# Patient Record
Sex: FEMALE | Race: WHITE
[De-identification: names, ages, dates, MRNs, and addresses within clinical notes are randomized per-mention and may not be internally consistent; named-entity substitution may affect disease eponyms.]

---

## 2018-12-03 ENCOUNTER — TRANSCRIPTION ENCOUNTER (OUTPATIENT)
Age: 23
End: 2018-12-03

## 2019-05-07 ENCOUNTER — TRANSCRIPTION ENCOUNTER (OUTPATIENT)
Age: 24
End: 2019-05-07

## 2022-03-17 ENCOUNTER — TRANSCRIPTION ENCOUNTER (OUTPATIENT)
Age: 27
End: 2022-03-17

## 2022-04-28 ENCOUNTER — APPOINTMENT (OUTPATIENT)
Dept: INTERNAL MEDICINE | Facility: CLINIC | Age: 27
End: 2022-04-28
Payer: COMMERCIAL

## 2022-04-28 ENCOUNTER — NON-APPOINTMENT (OUTPATIENT)
Age: 27
End: 2022-04-28

## 2022-04-28 ENCOUNTER — TRANSCRIPTION ENCOUNTER (OUTPATIENT)
Age: 27
End: 2022-04-28

## 2022-04-28 VITALS
BODY MASS INDEX: 27.99 KG/M2 | OXYGEN SATURATION: 96 % | WEIGHT: 168 LBS | SYSTOLIC BLOOD PRESSURE: 120 MMHG | DIASTOLIC BLOOD PRESSURE: 70 MMHG | HEIGHT: 65 IN | HEART RATE: 86 BPM

## 2022-04-28 DIAGNOSIS — M21.541 ACQUIRED CLUBFOOT, RIGHT FOOT: ICD-10-CM

## 2022-04-28 DIAGNOSIS — Z80.8 FAMILY HISTORY OF MALIGNANT NEOPLASM OF OTHER ORGANS OR SYSTEMS: ICD-10-CM

## 2022-04-28 DIAGNOSIS — M21.542 ACQUIRED CLUBFOOT, RIGHT FOOT: ICD-10-CM

## 2022-04-28 DIAGNOSIS — Z80.3 FAMILY HISTORY OF MALIGNANT NEOPLASM OF BREAST: ICD-10-CM

## 2022-04-28 DIAGNOSIS — Z82.49 FAMILY HISTORY OF ISCHEMIC HEART DISEASE AND OTHER DISEASES OF THE CIRCULATORY SYSTEM: ICD-10-CM

## 2022-04-28 DIAGNOSIS — R53.81 OTHER MALAISE: ICD-10-CM

## 2022-04-28 DIAGNOSIS — Z86.69 PERSONAL HISTORY OF OTHER DISEASES OF THE NERVOUS SYSTEM AND SENSE ORGANS: ICD-10-CM

## 2022-04-28 DIAGNOSIS — R53.83 OTHER MALAISE: ICD-10-CM

## 2022-04-28 DIAGNOSIS — Z01.419 ENCOUNTER FOR GYNECOLOGICAL EXAMINATION (GENERAL) (ROUTINE) W/OUT ABNORMAL FINDINGS: ICD-10-CM

## 2022-04-28 PROCEDURE — 99385 PREV VISIT NEW AGE 18-39: CPT | Mod: 25

## 2022-04-28 NOTE — HEALTH RISK ASSESSMENT
[Excellent] : ~his/her~  mood as  excellent [Never] : Never [No] : In the past 12 months have you used drugs other than those required for medical reasons? No [0] : 2) Feeling down, depressed, or hopeless: Not at all (0) [PHQ-2 Negative - No further assessment needed] : PHQ-2 Negative - No further assessment needed [Sexually Active] : sexually active [Smoke Detector] : smoke detector [Carbon Monoxide Detector] : carbon monoxide detector [Seat Belt] :  uses seat belt [Sunscreen] : uses sunscreen [Patient reported PAP Smear was normal] : Patient reported PAP Smear was normal [None] : None [With Significant Other] : lives with significant other [Employed] : employed [Significant Other] : lives with significant other [College] : College [# Of Children ___] : has [unfilled] children [de-identified] : walking [[id number]] : 0 [High Risk Behavior] : no high risk behavior [Reports changes in hearing] : Reports no changes in hearing [Reports changes in vision] : Reports no changes in vision [Reports changes in dental health] : Reports no changes in dental health [PapSmearDate] : 08/19

## 2022-04-28 NOTE — HISTORY OF PRESENT ILLNESS
[FreeTextEntry1] : New patient [de-identified] : 26 year old F comes to establish medical care and for an annual exam. Last PCP was Dr. Shashi Hinkle in [place], last seen in 2019. \par \par Hx of chronic migraines, had seen multiple neurologists in past. Tried numerous medications but did not agree with her. \par She has been gaining weight and is unsure why. She tries to follow healthy diet. Currently weighs 168 lb which is the highest she has ever weighed. Often feels tired during day. Sleeps 9 hours during work week. No snoring.

## 2022-04-28 NOTE — REVIEW OF SYSTEMS
[Negative] : Heme/Lymph [Fatigue] : fatigue [Recent Change In Weight] : ~T recent weight change [Headache] : headache [FreeTextEntry2] : weight gain

## 2022-04-28 NOTE — PHYSICAL EXAM
[No Acute Distress] : no acute distress [Well Nourished] : well nourished [Well Developed] : well developed [Well-Appearing] : well-appearing [Normal Sclera/Conjunctiva] : normal sclera/conjunctiva [PERRL] : pupils equal round and reactive to light [EOMI] : extraocular movements intact [Normal Outer Ear/Nose] : the outer ears and nose were normal in appearance [Normal Oropharynx] : the oropharynx was normal [Normal TMs] : both tympanic membranes were normal [No Lymphadenopathy] : no lymphadenopathy [Supple] : supple [Thyroid Normal, No Nodules] : the thyroid was normal and there were no nodules present [No Respiratory Distress] : no respiratory distress  [No Accessory Muscle Use] : no accessory muscle use [Clear to Auscultation] : lungs were clear to auscultation bilaterally [Normal Rate] : normal rate  [Regular Rhythm] : with a regular rhythm [Normal S1, S2] : normal S1 and S2 [No Murmur] : no murmur heard [No Varicosities] : no varicosities [Pedal Pulses Present] : the pedal pulses are present [No Edema] : there was no peripheral edema [Soft] : abdomen soft [Non Tender] : non-tender [Non-distended] : non-distended [No Masses] : no abdominal mass palpated [No HSM] : no HSM [Normal Bowel Sounds] : normal bowel sounds [Normal Posterior Cervical Nodes] : no posterior cervical lymphadenopathy [Normal Anterior Cervical Nodes] : no anterior cervical lymphadenopathy [No CVA Tenderness] : no CVA  tenderness [No Spinal Tenderness] : no spinal tenderness [No Joint Swelling] : no joint swelling [Grossly Normal Strength/Tone] : grossly normal strength/tone [No Rash] : no rash [Coordination Grossly Intact] : coordination grossly intact [No Focal Deficits] : no focal deficits [Normal Gait] : normal gait [Deep Tendon Reflexes (DTR)] : deep tendon reflexes were 2+ and symmetric [Normal Affect] : the affect was normal [Normal Mood] : the mood was normal [Normal Insight/Judgement] : insight and judgment were intact [de-identified] : friendly [de-identified] : defer to GYN

## 2022-04-28 NOTE — PLAN
[FreeTextEntry1] : Check routine fasting labs.\par Discussed diet, exercise, and weight maintenance. She will work on losing 10-15 lb.\par Referred to GYN Dr. Martinez for annual and PAP.\par Vaccines UTD. \par Start Fioricet PRN for migraines.  website consulted. Neurology referral.\par Generalized fatigue - check full lab panel. \par \par RTO in 1 yr for annual exam or earlier PRN for acute care.\par

## 2022-04-28 NOTE — PAST MEDICAL HISTORY
[Menstruating] : menstruating [Normal Amount/Duration] : it was of a normal amount and duration [Normal Duration] : the duration was normal [Regular Cycle Intervals] : have been regular [Total Preg ___] : G[unfilled]

## 2022-04-29 ENCOUNTER — TRANSCRIPTION ENCOUNTER (OUTPATIENT)
Age: 27
End: 2022-04-29

## 2022-04-29 LAB
ALBUMIN SERPL ELPH-MCNC: 4.7 G/DL
ALP BLD-CCNC: 112 U/L
ALT SERPL-CCNC: 14 U/L
ANION GAP SERPL CALC-SCNC: 13 MMOL/L
AST SERPL-CCNC: 20 U/L
BASOPHILS # BLD AUTO: 0.05 K/UL
BASOPHILS NFR BLD AUTO: 0.4 %
BILIRUB SERPL-MCNC: 0.2 MG/DL
BUN SERPL-MCNC: 18 MG/DL
CALCIUM SERPL-MCNC: 9.7 MG/DL
CHLORIDE SERPL-SCNC: 103 MMOL/L
CHOLEST SERPL-MCNC: 165 MG/DL
CO2 SERPL-SCNC: 24 MMOL/L
CREAT SERPL-MCNC: 0.78 MG/DL
EGFR: 106 ML/MIN/1.73M2
EOSINOPHIL # BLD AUTO: 0.04 K/UL
EOSINOPHIL NFR BLD AUTO: 0.3 %
ESTIMATED AVERAGE GLUCOSE: 100 MG/DL
GLUCOSE SERPL-MCNC: 63 MG/DL
HBA1C MFR BLD HPLC: 5.1 %
HCT VFR BLD CALC: 39.7 %
HDLC SERPL-MCNC: 65 MG/DL
HGB BLD-MCNC: 12.6 G/DL
IMM GRANULOCYTES NFR BLD AUTO: 0.2 %
LDLC SERPL CALC-MCNC: 82 MG/DL
LYMPHOCYTES # BLD AUTO: 3.21 K/UL
LYMPHOCYTES NFR BLD AUTO: 26.6 %
MAN DIFF?: NORMAL
MCHC RBC-ENTMCNC: 27 PG
MCHC RBC-ENTMCNC: 31.7 GM/DL
MCV RBC AUTO: 85 FL
MONOCYTES # BLD AUTO: 0.57 K/UL
MONOCYTES NFR BLD AUTO: 4.7 %
NEUTROPHILS # BLD AUTO: 8.16 K/UL
NEUTROPHILS NFR BLD AUTO: 67.8 %
NONHDLC SERPL-MCNC: 99 MG/DL
PLATELET # BLD AUTO: 287 K/UL
POTASSIUM SERPL-SCNC: 5.4 MMOL/L
PROT SERPL-MCNC: 7.2 G/DL
RBC # BLD: 4.67 M/UL
RBC # FLD: 13.2 %
SODIUM SERPL-SCNC: 139 MMOL/L
TRIGL SERPL-MCNC: 85 MG/DL
TSH SERPL-ACNC: 2.78 UIU/ML
VIT B12 SERPL-MCNC: 427 PG/ML
WBC # FLD AUTO: 12.06 K/UL

## 2022-05-31 ENCOUNTER — TRANSCRIPTION ENCOUNTER (OUTPATIENT)
Age: 27
End: 2022-05-31

## 2022-05-31 DIAGNOSIS — F41.9 ANXIETY DISORDER, UNSPECIFIED: ICD-10-CM

## 2022-07-07 ENCOUNTER — TRANSCRIPTION ENCOUNTER (OUTPATIENT)
Age: 27
End: 2022-07-07

## 2022-07-12 ENCOUNTER — APPOINTMENT (OUTPATIENT)
Dept: INTERNAL MEDICINE | Facility: CLINIC | Age: 27
End: 2022-07-12
Payer: COMMERCIAL

## 2022-07-12 VITALS
HEART RATE: 80 BPM | RESPIRATION RATE: 18 BRPM | BODY MASS INDEX: 28.16 KG/M2 | TEMPERATURE: 97.3 F | DIASTOLIC BLOOD PRESSURE: 70 MMHG | OXYGEN SATURATION: 98 % | SYSTOLIC BLOOD PRESSURE: 120 MMHG | WEIGHT: 169 LBS | HEIGHT: 65 IN

## 2022-07-12 PROCEDURE — 99213 OFFICE O/P EST LOW 20 MIN: CPT

## 2022-07-12 NOTE — REVIEW OF SYSTEMS
[Suicidal] : not suicidal [Insomnia] : no insomnia [Anxiety] : anxiety [Depression] : depression [Negative] : Constitutional

## 2022-07-12 NOTE — PHYSICAL EXAM
[Normal] : normal rate, regular rhythm, normal S1 and S2 and no murmur heard [No Edema] : there was no peripheral edema [Soft] : abdomen soft [Non Tender] : non-tender [Normal Affect] : the affect was normal [de-identified] : somber mood

## 2022-07-12 NOTE — PLAN
[FreeTextEntry1] : Start Lexapro 10 mg daily. Advised can take few weeks to give positive effect.\par Followed weekly with therapist.\par Provided contact info for [facility] but unsure if they take her insurance.\par Pt to message me in a few weeks on patient portal with clinical update.

## 2022-07-12 NOTE — HISTORY OF PRESENT ILLNESS
[FreeTextEntry1] : depression/anxiety [de-identified] : Pt comes for follow-up.\par \par She recently starting seeing a therapist who told her she has depression w/ anxiety. She is curious of starting medication. \par She often does not feel happy despite good things happening to her; recently bought house in [place] and is engaged to get  in 6/23. She is anhedonic and does not like leaving her home, prefers to watch TV often. She does not like to see her friends. She is afraid of going to public places. Has had anxiety for at least 1 yr. Has unexplained crying fits. Mood negatively affected since onset of pandemic. No insomnia. No SI/HI.\par Her father has depression and takes medications. She is concerned of hereditary component. \par She has been unable to find a psychiatrist through her insurance plan.

## 2022-07-18 ENCOUNTER — TRANSCRIPTION ENCOUNTER (OUTPATIENT)
Age: 27
End: 2022-07-18

## 2022-07-26 ENCOUNTER — TRANSCRIPTION ENCOUNTER (OUTPATIENT)
Age: 27
End: 2022-07-26

## 2022-07-26 RX ORDER — ALPRAZOLAM 0.25 MG/1
0.25 TABLET ORAL
Qty: 15 | Refills: 0 | Status: ACTIVE | COMMUNITY
Start: 2022-07-26 | End: 1900-01-01

## 2023-01-04 ENCOUNTER — TRANSCRIPTION ENCOUNTER (OUTPATIENT)
Age: 28
End: 2023-01-04

## 2023-05-01 ENCOUNTER — APPOINTMENT (OUTPATIENT)
Dept: INTERNAL MEDICINE | Facility: CLINIC | Age: 28
End: 2023-05-01
Payer: COMMERCIAL

## 2023-05-01 VITALS
BODY MASS INDEX: 31.16 KG/M2 | SYSTOLIC BLOOD PRESSURE: 110 MMHG | DIASTOLIC BLOOD PRESSURE: 68 MMHG | OXYGEN SATURATION: 97 % | TEMPERATURE: 98.2 F | HEART RATE: 81 BPM | HEIGHT: 65 IN | WEIGHT: 187 LBS

## 2023-05-01 DIAGNOSIS — D72.829 ELEVATED WHITE BLOOD CELL COUNT, UNSPECIFIED: ICD-10-CM

## 2023-05-01 PROCEDURE — 99395 PREV VISIT EST AGE 18-39: CPT | Mod: 25

## 2023-05-01 RX ORDER — BUTALBITAL, ACETAMINOPHEN AND CAFFEINE 325; 50; 40 MG/1; MG/1; MG/1
50-325-40 TABLET ORAL
Qty: 100 | Refills: 3 | Status: DISCONTINUED | COMMUNITY
Start: 2022-04-28 | End: 2023-05-01

## 2023-05-01 NOTE — REVIEW OF SYSTEMS
[Recent Change In Weight] : ~T recent weight change [Headache] : headache [Negative] : Heme/Lymph [Fatigue] : no fatigue [FreeTextEntry2] : 20 lb gain

## 2023-05-01 NOTE — PLAN
[FreeTextEntry1] : Check routine fasting labs.\par Discussed diet, exercise, and weight maintenance. Give trial of Phentermine.  website consulted.\par GYN exam and PAP UTD.\par Vaccines UTD. Flu shot in Fall.\par Mood is well-controlled on Lexapro 10 mg daily. She may eventually want to taper off the Lexapro. She stopped seeing therapist.\par She will see headache specialist in [place] for chronic migraines. Failed multiple medications.\par Recheck CBC - previous elevated WBC.\par \par RTO 3 months.\par

## 2023-05-01 NOTE — PHYSICAL EXAM
[No Acute Distress] : no acute distress [Well Nourished] : well nourished [Well Developed] : well developed [Well-Appearing] : well-appearing [Normal Sclera/Conjunctiva] : normal sclera/conjunctiva [PERRL] : pupils equal round and reactive to light [EOMI] : extraocular movements intact [Normal Outer Ear/Nose] : the outer ears and nose were normal in appearance [Normal Oropharynx] : the oropharynx was normal [Normal TMs] : both tympanic membranes were normal [No Lymphadenopathy] : no lymphadenopathy [Supple] : supple [Thyroid Normal, No Nodules] : the thyroid was normal and there were no nodules present [No Respiratory Distress] : no respiratory distress  [No Accessory Muscle Use] : no accessory muscle use [Clear to Auscultation] : lungs were clear to auscultation bilaterally [Normal Rate] : normal rate  [Regular Rhythm] : with a regular rhythm [Normal S1, S2] : normal S1 and S2 [No Murmur] : no murmur heard [No Varicosities] : no varicosities [Pedal Pulses Present] : the pedal pulses are present [No Edema] : there was no peripheral edema [Soft] : abdomen soft [Non Tender] : non-tender [Non-distended] : non-distended [No Masses] : no abdominal mass palpated [No HSM] : no HSM [Normal Bowel Sounds] : normal bowel sounds [No CVA Tenderness] : no CVA  tenderness [No Spinal Tenderness] : no spinal tenderness [No Joint Swelling] : no joint swelling [Grossly Normal Strength/Tone] : grossly normal strength/tone [No Rash] : no rash [Coordination Grossly Intact] : coordination grossly intact [No Focal Deficits] : no focal deficits [Normal Gait] : normal gait [Deep Tendon Reflexes (DTR)] : deep tendon reflexes were 2+ and symmetric [Normal Affect] : the affect was normal [Normal Mood] : the mood was normal [Normal Insight/Judgement] : insight and judgment were intact [de-identified] : friendly [de-identified] : defer to GYN

## 2023-05-01 NOTE — HEALTH RISK ASSESSMENT
[Excellent] : ~his/her~  mood as  excellent [No] : In the past 12 months have you used drugs other than those required for medical reasons? No [0] : 2) Feeling down, depressed, or hopeless: Not at all (0) [PHQ-2 Negative - No further assessment needed] : PHQ-2 Negative - No further assessment needed [Patient reported PAP Smear was normal] : Patient reported PAP Smear was normal [None] : None [With Significant Other] : lives with significant other [Employed] : employed [College] : College [Significant Other] : lives with significant other [# Of Children ___] : has [unfilled] children [Sexually Active] : sexually active [Smoke Detector] : smoke detector [Carbon Monoxide Detector] : carbon monoxide detector [Seat Belt] :  uses seat belt [Sunscreen] : uses sunscreen [Never] : Never [de-identified] : walking [AAT2Ggexl] : 0 [High Risk Behavior] : no high risk behavior [Reports changes in hearing] : Reports no changes in hearing [Reports changes in vision] : Reports no changes in vision [Reports changes in dental health] : Reports no changes in dental health [PapSmearDate] : 01/23

## 2023-05-01 NOTE — HISTORY OF PRESENT ILLNESS
[FreeTextEntry1] : Physical [de-identified] : Patient comes for an annual exam.\par \par She reports feeling well.\par Her mood has vastly improved on Lexapro 10 mg daily. She stopped seeing the therapist. \par She gained about 20 lb x 1 yr. She does admit to eating healthy and has been exercising. She is not sure why she is gaining weight but thinks it may be from the Lexapro. She may want to eventually taper off the Lexapro.\par Had recent migraine, had not had one in 4-5 months. Tried Fioricet but did not help.\par She is getting  next month. Bachelorette party is this weekend.

## 2023-05-02 ENCOUNTER — TRANSCRIPTION ENCOUNTER (OUTPATIENT)
Age: 28
End: 2023-05-02

## 2023-05-02 LAB
ALBUMIN SERPL ELPH-MCNC: 4.7 G/DL
ALP BLD-CCNC: 113 U/L
ALT SERPL-CCNC: 14 U/L
ANION GAP SERPL CALC-SCNC: 13 MMOL/L
APPEARANCE: CLEAR
AST SERPL-CCNC: 16 U/L
BACTERIA: ABNORMAL /HPF
BASOPHILS # BLD AUTO: 0.04 K/UL
BASOPHILS NFR BLD AUTO: 0.4 %
BILIRUB SERPL-MCNC: 0.2 MG/DL
BILIRUBIN URINE: NEGATIVE
BLOOD URINE: NEGATIVE
BUN SERPL-MCNC: 16 MG/DL
CALCIUM SERPL-MCNC: 9.8 MG/DL
CAST: 0 /LPF
CHLORIDE SERPL-SCNC: 105 MMOL/L
CHOLEST SERPL-MCNC: 164 MG/DL
CO2 SERPL-SCNC: 22 MMOL/L
COLOR: YELLOW
CREAT SERPL-MCNC: 0.7 MG/DL
EGFR: 120 ML/MIN/1.73M2
EOSINOPHIL # BLD AUTO: 0.02 K/UL
EOSINOPHIL NFR BLD AUTO: 0.2 %
EPITHELIAL CELLS: 10 /HPF
ESTIMATED AVERAGE GLUCOSE: 103 MG/DL
GLUCOSE QUALITATIVE U: NEGATIVE MG/DL
GLUCOSE SERPL-MCNC: 91 MG/DL
HBA1C MFR BLD HPLC: 5.2 %
HCT VFR BLD CALC: 39 %
HDLC SERPL-MCNC: 52 MG/DL
HGB BLD-MCNC: 12.7 G/DL
IMM GRANULOCYTES NFR BLD AUTO: 0.3 %
KETONES URINE: NEGATIVE MG/DL
LDLC SERPL CALC-MCNC: 90 MG/DL
LEUKOCYTE ESTERASE URINE: NEGATIVE
LYMPHOCYTES # BLD AUTO: 2.76 K/UL
LYMPHOCYTES NFR BLD AUTO: 24.8 %
MAN DIFF?: NORMAL
MCHC RBC-ENTMCNC: 27.2 PG
MCHC RBC-ENTMCNC: 32.6 GM/DL
MCV RBC AUTO: 83.5 FL
MICROSCOPIC-UA: NORMAL
MONOCYTES # BLD AUTO: 0.49 K/UL
MONOCYTES NFR BLD AUTO: 4.4 %
NEUTROPHILS # BLD AUTO: 7.78 K/UL
NEUTROPHILS NFR BLD AUTO: 69.9 %
NITRITE URINE: NEGATIVE
NONHDLC SERPL-MCNC: 112 MG/DL
PH URINE: 6
PLATELET # BLD AUTO: 324 K/UL
POTASSIUM SERPL-SCNC: 4.4 MMOL/L
PROT SERPL-MCNC: 7 G/DL
PROTEIN URINE: NEGATIVE MG/DL
RBC # BLD: 4.67 M/UL
RBC # FLD: 13.7 %
RED BLOOD CELLS URINE: 1 /HPF
SODIUM SERPL-SCNC: 139 MMOL/L
SPECIFIC GRAVITY URINE: 1.02
TRIGL SERPL-MCNC: 108 MG/DL
TSH SERPL-ACNC: 1.11 UIU/ML
UROBILINOGEN URINE: 0.2 MG/DL
VIT B12 SERPL-MCNC: 416 PG/ML
WBC # FLD AUTO: 11.12 K/UL
WHITE BLOOD CELLS URINE: 7 /HPF

## 2023-05-10 ENCOUNTER — TRANSCRIPTION ENCOUNTER (OUTPATIENT)
Age: 28
End: 2023-05-10

## 2023-05-13 ENCOUNTER — TRANSCRIPTION ENCOUNTER (OUTPATIENT)
Age: 28
End: 2023-05-13

## 2023-06-08 ENCOUNTER — TRANSCRIPTION ENCOUNTER (OUTPATIENT)
Age: 28
End: 2023-06-08

## 2023-07-12 ENCOUNTER — TRANSCRIPTION ENCOUNTER (OUTPATIENT)
Age: 28
End: 2023-07-12

## 2023-08-23 ENCOUNTER — TRANSCRIPTION ENCOUNTER (OUTPATIENT)
Age: 28
End: 2023-08-23

## 2023-08-28 ENCOUNTER — NON-APPOINTMENT (OUTPATIENT)
Age: 28
End: 2023-08-28

## 2023-09-07 ENCOUNTER — APPOINTMENT (OUTPATIENT)
Dept: INTERNAL MEDICINE | Facility: CLINIC | Age: 28
End: 2023-09-07
Payer: COMMERCIAL

## 2023-09-07 VITALS
WEIGHT: 176.13 LBS | SYSTOLIC BLOOD PRESSURE: 130 MMHG | OXYGEN SATURATION: 95 % | BODY MASS INDEX: 29.34 KG/M2 | DIASTOLIC BLOOD PRESSURE: 85 MMHG | RESPIRATION RATE: 12 BRPM | HEART RATE: 106 BPM | HEIGHT: 65 IN

## 2023-09-07 PROCEDURE — 99213 OFFICE O/P EST LOW 20 MIN: CPT

## 2023-09-07 NOTE — HISTORY OF PRESENT ILLNESS
[FreeTextEntry1] : depression/anxiety [de-identified] : Pt comes for follow-up visit.  She reports her mood is well-controlled on Lexapro. She wishes to remain on it for now. She is followed by therapist. She lost 11 lb since 5/23. She has been on Phentermine but does not think is helping much anymore.  She got  in June.

## 2023-09-07 NOTE — REVIEW OF SYSTEMS
[Negative] : Constitutional [Recent Change In Weight] : ~T recent weight change [Suicidal] : not suicidal [Insomnia] : no insomnia [Anxiety] : no anxiety [Depression] : no depression [FreeTextEntry2] : 11 lb loss

## 2023-09-07 NOTE — PHYSICAL EXAM
[Normal] : normal rate, regular rhythm, normal S1 and S2 and no murmur heard [No Edema] : there was no peripheral edema [Soft] : abdomen soft [Non Tender] : non-tender [Normal Affect] : the affect was normal [de-identified] : somber mood [No Rash] : no rash [Normal Mood] : the mood was normal [de-identified] : friendly, appears well

## 2023-09-07 NOTE — PLAN
[FreeTextEntry1] : Depression w/ anxiety - mood is controlled. Continue Lexapro 10 mg qd. Followed with therapist. She will stop Phentermine. Recommend seeing dietician. Continue adequate exercise.  RTO 5/24 for physical.

## 2024-01-16 ENCOUNTER — TRANSCRIPTION ENCOUNTER (OUTPATIENT)
Age: 29
End: 2024-01-16

## 2024-01-17 ENCOUNTER — TRANSCRIPTION ENCOUNTER (OUTPATIENT)
Age: 29
End: 2024-01-17

## 2024-03-26 ENCOUNTER — TRANSCRIPTION ENCOUNTER (OUTPATIENT)
Age: 29
End: 2024-03-26

## 2024-04-29 ENCOUNTER — TRANSCRIPTION ENCOUNTER (OUTPATIENT)
Age: 29
End: 2024-04-29

## 2024-04-29 RX ORDER — PHENTERMINE HYDROCHLORIDE 37.5 MG/1
37.5 CAPSULE ORAL
Qty: 30 | Refills: 0 | Status: ACTIVE | COMMUNITY
Start: 2023-05-01 | End: 1900-01-01

## 2024-05-06 PROBLEM — E66.9 OBESITY (BMI 30.0-34.9): Status: ACTIVE | Noted: 2023-05-01

## 2024-05-06 PROBLEM — F41.8 DEPRESSION WITH ANXIETY: Status: ACTIVE | Noted: 2022-07-12

## 2024-05-07 ENCOUNTER — APPOINTMENT (OUTPATIENT)
Dept: INTERNAL MEDICINE | Facility: CLINIC | Age: 29
End: 2024-05-07
Payer: COMMERCIAL

## 2024-05-07 VITALS
RESPIRATION RATE: 12 BRPM | HEIGHT: 65 IN | DIASTOLIC BLOOD PRESSURE: 82 MMHG | SYSTOLIC BLOOD PRESSURE: 121 MMHG | HEART RATE: 108 BPM | BODY MASS INDEX: 34.16 KG/M2 | OXYGEN SATURATION: 99 % | TEMPERATURE: 98.2 F | WEIGHT: 205 LBS

## 2024-05-07 DIAGNOSIS — F41.8 OTHER SPECIFIED ANXIETY DISORDERS: ICD-10-CM

## 2024-05-07 DIAGNOSIS — Z00.00 ENCOUNTER FOR GENERAL ADULT MEDICAL EXAMINATION W/OUT ABNORMAL FINDINGS: ICD-10-CM

## 2024-05-07 DIAGNOSIS — J06.9 ACUTE UPPER RESPIRATORY INFECTION, UNSPECIFIED: ICD-10-CM

## 2024-05-07 DIAGNOSIS — G43.909 MIGRAINE, UNSPECIFIED, NOT INTRACTABLE, W/OUT STATUS MIGRAINOSUS: ICD-10-CM

## 2024-05-07 DIAGNOSIS — F12.91 CANNABIS USE, UNSPECIFIED, IN REMISSION: ICD-10-CM

## 2024-05-07 DIAGNOSIS — E66.9 OBESITY, UNSPECIFIED: ICD-10-CM

## 2024-05-07 PROCEDURE — 99395 PREV VISIT EST AGE 18-39: CPT | Mod: 25

## 2024-05-07 PROCEDURE — G0447 BEHAVIOR COUNSEL OBESITY 15M: CPT | Mod: NC,59

## 2024-05-07 PROCEDURE — 36415 COLL VENOUS BLD VENIPUNCTURE: CPT

## 2024-05-07 RX ORDER — NORETHINDRONE ACETATE 5 MG/1
5 TABLET ORAL DAILY
Qty: 30 | Refills: 0 | Status: DISCONTINUED | COMMUNITY
Start: 2023-05-30 | End: 2024-05-07

## 2024-05-07 RX ORDER — ESCITALOPRAM OXALATE 10 MG/1
10 TABLET ORAL
Qty: 90 | Refills: 1 | Status: DISCONTINUED | COMMUNITY
Start: 2022-07-12 | End: 2024-05-07

## 2024-05-07 NOTE — PLAN
[FreeTextEntry1] : Check routine fasting labs. Discussed diet, exercise, and weight loss efforts. Phentermine restarted in 3/24. GYN exam and PAP UTD. Flu shot in Fall. Mood remains controlled. Lexapro stopped in 1/24. Chronic migraines - alleviated with marijuana. Failed multiple medications. No longer sees neurology.  RTO 6 months for weight check.

## 2024-05-07 NOTE — REVIEW OF SYSTEMS
[Recent Change In Weight] : ~T recent weight change [Headache] : headache [Negative] : Heme/Lymph [Fatigue] : no fatigue [FreeTextEntry2] : 29 lb gain

## 2024-05-07 NOTE — HISTORY OF PRESENT ILLNESS
[FreeTextEntry1] : Physical [de-identified] : Patient comes for an annual exam.  She reports feeling well. She gained 29 lb since 9/23. She was restarted on Phentermine in 3/24. She feels her appetite is more diminished. She recently started going back to the gym.  She tapered off the Lexapro in 1/24. Her mood remains controlled. She had multiple URI's during this winter. She is worried about her immune system. Has chronic migraines, failed multiple meds. No longer sees neurology. She notes marijuana alleviates the headaches.

## 2024-05-07 NOTE — PHYSICAL EXAM
[No Acute Distress] : no acute distress [Well Nourished] : well nourished [Well Developed] : well developed [Well-Appearing] : well-appearing [Normal Sclera/Conjunctiva] : normal sclera/conjunctiva [PERRL] : pupils equal round and reactive to light [EOMI] : extraocular movements intact [Normal Outer Ear/Nose] : the outer ears and nose were normal in appearance [Normal Oropharynx] : the oropharynx was normal [Normal TMs] : both tympanic membranes were normal [No Lymphadenopathy] : no lymphadenopathy [Supple] : supple [Thyroid Normal, No Nodules] : the thyroid was normal and there were no nodules present [No Respiratory Distress] : no respiratory distress  [No Accessory Muscle Use] : no accessory muscle use [Clear to Auscultation] : lungs were clear to auscultation bilaterally [Normal Rate] : normal rate  [Regular Rhythm] : with a regular rhythm [Normal S1, S2] : normal S1 and S2 [No Murmur] : no murmur heard [No Varicosities] : no varicosities [Pedal Pulses Present] : the pedal pulses are present [No Edema] : there was no peripheral edema [Soft] : abdomen soft [Non Tender] : non-tender [Non-distended] : non-distended [No Masses] : no abdominal mass palpated [Normal Bowel Sounds] : normal bowel sounds [No CVA Tenderness] : no CVA  tenderness [No Spinal Tenderness] : no spinal tenderness [No Joint Swelling] : no joint swelling [Grossly Normal Strength/Tone] : grossly normal strength/tone [No Rash] : no rash [Coordination Grossly Intact] : coordination grossly intact [No Focal Deficits] : no focal deficits [Normal Gait] : normal gait [Deep Tendon Reflexes (DTR)] : deep tendon reflexes were 2+ and symmetric [Normal Affect] : the affect was normal [Normal Mood] : the mood was normal [Normal Insight/Judgement] : insight and judgment were intact [de-identified] : friendly [de-identified] : defer to GYN

## 2024-05-07 NOTE — HEALTH RISK ASSESSMENT
[Excellent] : ~his/her~  mood as  excellent [No] : No [0] : 2) Feeling down, depressed, or hopeless: Not at all (0) [Patient reported PAP Smear was normal] : Patient reported PAP Smear was normal [None] : None [With Significant Other] : lives with significant other [Employed] : employed [College] : College [Significant Other] : lives with significant other [# Of Children ___] : has [unfilled] children [Sexually Active] : sexually active [Smoke Detector] : smoke detector [Carbon Monoxide Detector] : carbon monoxide detector [Seat Belt] :  uses seat belt [Sunscreen] : uses sunscreen [Never] : Never [PHQ-2 Negative - No further assessment needed] : PHQ-2 Negative - No further assessment needed [Yes] : In the past 12 months have you used drugs other than those required for medical reasons? Yes [de-identified] : marijuana [de-identified] : walking, gym workout [[id number]] : 0 [High Risk Behavior] : no high risk behavior [Reports changes in hearing] : Reports no changes in hearing [Reports changes in vision] : Reports no changes in vision [Reports changes in dental health] : Reports no changes in dental health [PapSmearDate] : 01/24

## 2024-05-12 ENCOUNTER — TRANSCRIPTION ENCOUNTER (OUTPATIENT)
Age: 29
End: 2024-05-12

## 2024-05-12 LAB
ALBUMIN SERPL ELPH-MCNC: 4.7 G/DL
ALP BLD-CCNC: 119 U/L
ALT SERPL-CCNC: 13 U/L
ANION GAP SERPL CALC-SCNC: 13 MMOL/L
AST SERPL-CCNC: 16 U/L
BASOPHILS # BLD AUTO: 0.03 K/UL
BASOPHILS NFR BLD AUTO: 0.4 %
BILIRUB SERPL-MCNC: 0.3 MG/DL
BUN SERPL-MCNC: 12 MG/DL
CALCIUM SERPL-MCNC: 9.6 MG/DL
CHLORIDE SERPL-SCNC: 104 MMOL/L
CHOLEST SERPL-MCNC: 182 MG/DL
CO2 SERPL-SCNC: 21 MMOL/L
CREAT SERPL-MCNC: 0.74 MG/DL
DEPRECATED KAPPA LC FREE/LAMBDA SER: 1.63 RATIO
EGFR: 112 ML/MIN/1.73M2
EOSINOPHIL # BLD AUTO: 0.02 K/UL
EOSINOPHIL NFR BLD AUTO: 0.3 %
ESTIMATED AVERAGE GLUCOSE: 103 MG/DL
GLUCOSE SERPL-MCNC: 92 MG/DL
HBA1C MFR BLD HPLC: 5.2 %
HCT VFR BLD CALC: 38.5 %
HDLC SERPL-MCNC: 49 MG/DL
HGB BLD-MCNC: 12.5 G/DL
IGA SER QL IEP: 126 MG/DL
IGG SER QL IEP: 975 MG/DL
IGM SER QL IEP: 75 MG/DL
IMM GRANULOCYTES NFR BLD AUTO: 0.3 %
KAPPA LC CSF-MCNC: 1.15 MG/DL
KAPPA LC SERPL-MCNC: 1.88 MG/DL
LDLC SERPL CALC-MCNC: 120 MG/DL
LYMPHOCYTES # BLD AUTO: 1.8 K/UL
LYMPHOCYTES NFR BLD AUTO: 23.2 %
MAN DIFF?: NORMAL
MCHC RBC-ENTMCNC: 25.7 PG
MCHC RBC-ENTMCNC: 32.5 GM/DL
MCV RBC AUTO: 79.2 FL
MONOCYTES # BLD AUTO: 0.31 K/UL
MONOCYTES NFR BLD AUTO: 4 %
NEUTROPHILS # BLD AUTO: 5.58 K/UL
NEUTROPHILS NFR BLD AUTO: 71.8 %
NONHDLC SERPL-MCNC: 134 MG/DL
PLATELET # BLD AUTO: 336 K/UL
POTASSIUM SERPL-SCNC: 4.7 MMOL/L
PROT SERPL-MCNC: 7.2 G/DL
RBC # BLD: 4.86 M/UL
RBC # FLD: 14.3 %
SODIUM SERPL-SCNC: 138 MMOL/L
TRIGL SERPL-MCNC: 71 MG/DL
TSH SERPL-ACNC: 1.72 UIU/ML
VIT B12 SERPL-MCNC: 442 PG/ML
WBC # FLD AUTO: 7.76 K/UL

## 2024-05-13 ENCOUNTER — TRANSCRIPTION ENCOUNTER (OUTPATIENT)
Age: 29
End: 2024-05-13

## 2024-11-12 ENCOUNTER — APPOINTMENT (OUTPATIENT)
Dept: INTERNAL MEDICINE | Facility: CLINIC | Age: 29
End: 2024-11-12

## 2025-05-28 ENCOUNTER — TRANSCRIPTION ENCOUNTER (OUTPATIENT)
Age: 30
End: 2025-05-28

## 2025-05-29 ENCOUNTER — APPOINTMENT (OUTPATIENT)
Dept: INTERNAL MEDICINE | Facility: CLINIC | Age: 30
End: 2025-05-29
Payer: COMMERCIAL

## 2025-05-29 VITALS
BODY MASS INDEX: 25.83 KG/M2 | DIASTOLIC BLOOD PRESSURE: 80 MMHG | SYSTOLIC BLOOD PRESSURE: 100 MMHG | WEIGHT: 155 LBS | OXYGEN SATURATION: 98 % | HEART RATE: 113 BPM | HEIGHT: 65 IN | TEMPERATURE: 98.2 F

## 2025-05-29 DIAGNOSIS — G43.909 MIGRAINE, UNSPECIFIED, NOT INTRACTABLE, W/OUT STATUS MIGRAINOSUS: ICD-10-CM

## 2025-05-29 DIAGNOSIS — F41.8 OTHER SPECIFIED ANXIETY DISORDERS: ICD-10-CM

## 2025-05-29 DIAGNOSIS — Z00.00 ENCOUNTER FOR GENERAL ADULT MEDICAL EXAMINATION W/OUT ABNORMAL FINDINGS: ICD-10-CM

## 2025-05-29 DIAGNOSIS — E66.811 OBESITY, CLASS 1: ICD-10-CM

## 2025-05-29 PROCEDURE — 36415 COLL VENOUS BLD VENIPUNCTURE: CPT

## 2025-05-29 PROCEDURE — 99395 PREV VISIT EST AGE 18-39: CPT

## 2025-05-29 RX ORDER — TIRZEPATIDE 7.5 MG/.5ML
7.5 INJECTION, SOLUTION SUBCUTANEOUS
Qty: 1 | Refills: 1 | Status: ACTIVE | COMMUNITY
Start: 2025-05-29

## 2025-05-30 ENCOUNTER — TRANSCRIPTION ENCOUNTER (OUTPATIENT)
Age: 30
End: 2025-05-30

## 2025-05-30 LAB
ALBUMIN SERPL ELPH-MCNC: 4.5 G/DL
ALP BLD-CCNC: 91 U/L
ALT SERPL-CCNC: 14 U/L
ANION GAP SERPL CALC-SCNC: 12 MMOL/L
APPEARANCE: ABNORMAL
AST SERPL-CCNC: 20 U/L
BACTERIA: ABNORMAL /HPF
BASOPHILS # BLD AUTO: 0.04 K/UL
BASOPHILS NFR BLD AUTO: 0.4 %
BILIRUB SERPL-MCNC: 0.3 MG/DL
BILIRUBIN URINE: NEGATIVE
BLOOD URINE: NEGATIVE
BUN SERPL-MCNC: 12 MG/DL
CALCIUM SERPL-MCNC: 9.7 MG/DL
CAST: 2 /LPF
CHLORIDE SERPL-SCNC: 104 MMOL/L
CHOLEST SERPL-MCNC: 155 MG/DL
CO2 SERPL-SCNC: 22 MMOL/L
COLOR: YELLOW
CREAT SERPL-MCNC: 0.78 MG/DL
EGFRCR SERPLBLD CKD-EPI 2021: 104 ML/MIN/1.73M2
EOSINOPHIL # BLD AUTO: 0.04 K/UL
EOSINOPHIL NFR BLD AUTO: 0.4 %
EPITHELIAL CELLS: 22 /HPF
ESTIMATED AVERAGE GLUCOSE: 91 MG/DL
GLUCOSE QUALITATIVE U: NEGATIVE MG/DL
GLUCOSE SERPL-MCNC: 87 MG/DL
HBA1C MFR BLD HPLC: 4.8 %
HCT VFR BLD CALC: 37 %
HDLC SERPL-MCNC: 49 MG/DL
HGB BLD-MCNC: 12 G/DL
IMM GRANULOCYTES NFR BLD AUTO: 0.2 %
KETONES URINE: ABNORMAL MG/DL
LDLC SERPL-MCNC: 94 MG/DL
LEUKOCYTE ESTERASE URINE: ABNORMAL
LYMPHOCYTES # BLD AUTO: 2.78 K/UL
LYMPHOCYTES NFR BLD AUTO: 28.1 %
MAN DIFF?: NORMAL
MCHC RBC-ENTMCNC: 27 PG
MCHC RBC-ENTMCNC: 32.4 G/DL
MCV RBC AUTO: 83.3 FL
MICROSCOPIC-UA: NORMAL
MONOCYTES # BLD AUTO: 0.34 K/UL
MONOCYTES NFR BLD AUTO: 3.4 %
MUCUS: PRESENT
NEUTROPHILS # BLD AUTO: 6.68 K/UL
NEUTROPHILS NFR BLD AUTO: 67.5 %
NITRITE URINE: NEGATIVE
NONHDLC SERPL-MCNC: 107 MG/DL
PH URINE: 5.5
PLATELET # BLD AUTO: 303 K/UL
POTASSIUM SERPL-SCNC: 4 MMOL/L
PROT SERPL-MCNC: 6.8 G/DL
PROTEIN URINE: 30 MG/DL
RBC # BLD: 4.44 M/UL
RBC # FLD: 14.2 %
RED BLOOD CELLS URINE: 0 /HPF
REVIEW: NORMAL
SODIUM SERPL-SCNC: 139 MMOL/L
SPECIFIC GRAVITY URINE: >1.03
TRIGL SERPL-MCNC: 67 MG/DL
TSH SERPL-ACNC: 1.11 UIU/ML
UROBILINOGEN URINE: 0.2 MG/DL
VIT B12 SERPL-MCNC: 324 PG/ML
WBC # FLD AUTO: 9.9 K/UL
WHITE BLOOD CELLS URINE: 39 /HPF

## 2025-05-31 ENCOUNTER — TRANSCRIPTION ENCOUNTER (OUTPATIENT)
Age: 30
End: 2025-05-31